# Patient Record
Sex: FEMALE | Race: WHITE | NOT HISPANIC OR LATINO | Employment: UNEMPLOYED | URBAN - NONMETROPOLITAN AREA
[De-identification: names, ages, dates, MRNs, and addresses within clinical notes are randomized per-mention and may not be internally consistent; named-entity substitution may affect disease eponyms.]

---

## 2017-06-30 ENCOUNTER — PROCEDURE VISIT (OUTPATIENT)
Dept: OTOLARYNGOLOGY | Facility: CLINIC | Age: 72
End: 2017-06-30

## 2017-06-30 ENCOUNTER — OFFICE VISIT (OUTPATIENT)
Dept: OTOLARYNGOLOGY | Facility: CLINIC | Age: 72
End: 2017-06-30

## 2017-06-30 VITALS
BODY MASS INDEX: 30.62 KG/M2 | DIASTOLIC BLOOD PRESSURE: 82 MMHG | TEMPERATURE: 97.4 F | HEIGHT: 68 IN | WEIGHT: 202 LBS | SYSTOLIC BLOOD PRESSURE: 155 MMHG

## 2017-06-30 DIAGNOSIS — H93.11 TINNITUS, RIGHT: Primary | ICD-10-CM

## 2017-06-30 DIAGNOSIS — H93.13 TINNITUS, BILATERAL: ICD-10-CM

## 2017-06-30 DIAGNOSIS — H90.3 SENSORINEURAL HEARING LOSS (SNHL), BILATERAL: Primary | ICD-10-CM

## 2017-06-30 PROBLEM — H93.19 TINNITUS: Status: ACTIVE | Noted: 2017-06-30

## 2017-06-30 PROCEDURE — 99203 OFFICE O/P NEW LOW 30 MIN: CPT | Performed by: PHYSICIAN ASSISTANT

## 2017-06-30 PROCEDURE — 92550 TYMPANOMETRY & REFLEX THRESH: CPT | Performed by: AUDIOLOGIST

## 2017-06-30 RX ORDER — ATORVASTATIN CALCIUM 20 MG/1
TABLET, FILM COATED ORAL
COMMUNITY
Start: 2017-06-13

## 2017-06-30 RX ORDER — GABAPENTIN 300 MG/1
CAPSULE ORAL
COMMUNITY
Start: 2017-05-17

## 2017-06-30 RX ORDER — LEVOTHYROXINE SODIUM 0.05 MG/1
TABLET ORAL
COMMUNITY
Start: 2017-06-29

## 2017-06-30 NOTE — PROGRESS NOTES
CASE HISTORY DETAILS   Ms. Galicia presented to the clinic this date with complaints of right ear tinnitus, onset approximately a year and a half ago. She first noticed tinnitus following sinus congestion. She also reported sinus pressure and aural fullness on the right side.     SUMMARY   RIGHT  · Otoscopy revealed clear EAC/Unremarkable TM.  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  · Immitance measures are consistent with normal Type A tympanogram.    LEFT  · Otoscopy revealed clear EAC/Unremarkable TM.  · Mild to moderately severe sloping mixed hearing loss.  · Immitance measures are consistent with normal Type A tympanogram.    RECOMMENDATIONS   Results of today's evaluation were discussed with Ms. Galicia and the following recommendations were made:  1. ENT evaluation today as scheduled.  2. HAE as desired pending medical clearance.  3. Monitor hearing yearly.    AUDIOGRAM AND IMMITANCE       Lashanda Martínez, CCC-A  Audiologist

## 2017-06-30 NOTE — PATIENT INSTRUCTIONS
TINNITUS MASKERS  (White Noise Signal Generators)    What is Tinnitus?  Tinnitus is a condition where noises in the ear or the head may be heard but which have no external cause.  The intensity and type of noise varies from each sufferer, but the most common reports are of ringing noises and high-pitched pure tones.  These noises may be heard all day or for short spells throughout the day.  This may cause lack of concentration and sleep and in some cases may cause severe discomfort.    What is a Tinnitus Masker?  A tinnitus masker is an electronic device which generates a band of noise of sufficient frequency and intensity to “Mask” the tinnitus so as to distract the sufferers mind from the noises, allowing them to carry on with their normal routines.    Facts about Tinnitus  The word “tinnitus” derives from the Latin works “tinnire” and means to ring or tinkle.  The noise heard by suffers is often said to sound like ringing, roaring, hissing or clicking in the ear.  This noise is heard only by the person suffering from the condition and has no external cause.  Almost everybody may experience noises in the ears or head if quiet ambient conditions prevail, but these are usually of low intensity and short duration.  When the intensity and duration increase to the point where the noises are heard during normal everyday conditions, the sufferer may experience discomfort and stress.  This is known as Tinnitus.    What causes Tinnitus?  Scientific and medical knowledge about tinnitus is limited, but research has shown the tinnitus may be caused by exposure to loud noises, disease or a hearing loss. But the majority of cases appear to involve the inner ear, the neural pathways between the ear and the brain itself.  Diet can also be a contributing factor, as some foods are generally identified as causing or aggravating tinnitus.     Alcohol excess alcohol can induce tinnitus in almost everyone.   Salt  Reducing salt intake  may help reduce body fluids and help tinnitus.   Caffeine May stimulate the nervous system  Cheese (and other dairy products) are the most commonly identified foods causing or aggravating tinnitus.    Cure or Remedy  A cure for tinnitus has not presently been identified as there is insufficient knowledge on the subject.  However, it has been established that a successful method of relieving the symptoms of tinnitus is to mask the noises using a device know as a Tinnitus Masker.  These devices generate electronically a band of noise of sufficient frequency and intensity to “block out” or mask the noises within the ear/head.  For users of hearing aids, the hearing aid on its own may provide sufficient sound to relieve the tinnitus.                               Warning  Tinnitus maskers should not be worn too loud or for long periods continuously as this can damage your hearing.    The Puretone Range of Tinnitus Maskers    Custom Made Devices  These Tinnitus maskers are manufactured from an impression of the ear so it fits snugly, matching your individual ear contours.  They may be made in a variety of sizes to fit in the ear or in the ear canal.  Large vents are generally fitted so as not to occlude the ear.  Each masker will have a battery compartment and a volume control (with ON/OFF switch) allowing you to select a comfortable but effective level of loudness.    Behind the Ear Devices  A range of tinnitus maskers incorporated into hearing aid cases which fit neatly behind the ear.  Three models are available of different sizes each of which can be fitted with one of three frequency weighted circuits.  Each masker will be fitted with a battery compartment, an ON/Off switch and a used operated tone switch.    Combination Devices  Either of the above devices may be made as a combination hearing aid/tinnitus masker.  This allows it to be used as a hearing aid for normal use and then the masker noise may be mixed with  the hearing aid when required.  These are designed for hearing impaired people who suffer from tinnitus.    Standard Canal Devices  Two models make up this range of generic tinnitus maskers which are small enough to fit within the ear canal.  The first model is fitted with a bullet shaped shell and the second is fitted with a contoured shell.  This allows for greater flexibility within a small generic device.  Each model is fitted with a battery compartment and a volume control which incorporates the ON/Off switch.    Desktop/Pillow Devices  These devices can be placed on the desk at home or in the office and used at a distance away from the user.  The pillow masker is fitted with a timer which may be set so that it will switch off the device when the user is asleep.  A slimline external transducer is placed under the pillow, allowing the masking noise to filter through the pillow so as to aid sleep.